# Patient Record
Sex: FEMALE | Race: OTHER | HISPANIC OR LATINO
[De-identification: names, ages, dates, MRNs, and addresses within clinical notes are randomized per-mention and may not be internally consistent; named-entity substitution may affect disease eponyms.]

---

## 2017-08-28 ENCOUNTER — APPOINTMENT (OUTPATIENT)
Dept: DERMATOLOGY | Facility: CLINIC | Age: 62
End: 2017-08-28
Payer: COMMERCIAL

## 2017-08-28 PROCEDURE — 99203 OFFICE O/P NEW LOW 30 MIN: CPT

## 2019-12-02 ENCOUNTER — RESULT REVIEW (OUTPATIENT)
Age: 64
End: 2019-12-02

## 2020-12-09 ENCOUNTER — APPOINTMENT (OUTPATIENT)
Dept: DERMATOLOGY | Facility: CLINIC | Age: 65
End: 2020-12-09
Payer: MEDICAID

## 2020-12-09 PROCEDURE — 99072 ADDL SUPL MATRL&STAF TM PHE: CPT

## 2020-12-09 PROCEDURE — 99203 OFFICE O/P NEW LOW 30 MIN: CPT

## 2020-12-21 ENCOUNTER — RESULT REVIEW (OUTPATIENT)
Age: 65
End: 2020-12-21

## 2021-12-23 ENCOUNTER — APPOINTMENT (OUTPATIENT)
Dept: DERMATOLOGY | Facility: CLINIC | Age: 66
End: 2021-12-23
Payer: COMMERCIAL

## 2021-12-23 PROCEDURE — 99213 OFFICE O/P EST LOW 20 MIN: CPT

## 2023-01-10 ENCOUNTER — OFFICE (OUTPATIENT)
Dept: URBAN - METROPOLITAN AREA CLINIC 6 | Facility: CLINIC | Age: 68
Setting detail: OPHTHALMOLOGY
End: 2023-01-10
Payer: MEDICARE

## 2023-01-10 DIAGNOSIS — H11.153: ICD-10-CM

## 2023-01-10 DIAGNOSIS — H25.13: ICD-10-CM

## 2023-01-10 DIAGNOSIS — H01.001: ICD-10-CM

## 2023-01-10 DIAGNOSIS — H01.004: ICD-10-CM

## 2023-01-10 DIAGNOSIS — H52.4: ICD-10-CM

## 2023-01-10 PROCEDURE — 92015 DETERMINE REFRACTIVE STATE: CPT | Performed by: OPHTHALMOLOGY

## 2023-01-10 PROCEDURE — 92014 COMPRE OPH EXAM EST PT 1/>: CPT | Performed by: OPHTHALMOLOGY

## 2023-01-10 ASSESSMENT — SPHEQUIV_DERIVED
OS_SPHEQUIV: 1.375
OS_SPHEQUIV: 1.75
OD_SPHEQUIV: 1.75
OS_SPHEQUIV: 1.375
OD_SPHEQUIV: 1.75
OD_SPHEQUIV: 1.875

## 2023-01-10 ASSESSMENT — TEAR BREAK UP TIME (TBUT)
OD_TBUT: T
OS_TBUT: T

## 2023-01-10 ASSESSMENT — AXIALLENGTH_DERIVED
OS_AL: 22.8938
OD_AL: 22.6271
OS_AL: 22.7571
OD_AL: 22.6719
OS_AL: 22.8938
OD_AL: 22.6719

## 2023-01-10 ASSESSMENT — KERATOMETRY
OD_K1POWER_DIOPTERS: 44.00
METHOD_AUTO_MANUAL: AUTO
OD_AXISANGLE_DEGREES: 140
OS_K2POWER_DIOPTERS: 44.25
OS_AXISANGLE_DEGREES: 37
OS_K1POWER_DIOPTERS: 43.75
OD_K2POWER_DIOPTERS: 44.50

## 2023-01-10 ASSESSMENT — REFRACTION_MANIFEST
OD_ADD: +2.75
OD_VA1: 20/30-
OS_SPHERE: +2.00
OD_VA1: 20/30-
OD_AXIS: 80
OS_SPHERE: +2.00
OD_SPHERE: +2.00
OS_AXIS: 115
OS_VA1: 20/30+
OS_CYLINDER: -1.25
OD_AXIS: 80
OS_VA1: 20/30+
OD_CYLINDER: -0.50
OS_AXIS: 115
OD_CYLINDER: -0.50
OS_ADD: +2.75
OS_CYLINDER: -1.25
OD_SPHERE: +2.00
OD_ADD: +2.75
OS_ADD: +2.75

## 2023-01-10 ASSESSMENT — TONOMETRY
OD_IOP_MMHG: 18
OS_IOP_MMHG: 20

## 2023-01-10 ASSESSMENT — REFRACTION_CURRENTRX
OD_AXIS: 70
OS_AXIS: 93
OS_CYLINDER: -0.75
OD_SPHERE: +2.00
OD_ADD: +2.50
OS_OVR_VA: 20/
OS_SPHERE: +1.75
OD_CYLINDER: -0.50
OS_ADD: +2.50
OD_VPRISM_DIRECTION: SV
OS_VPRISM_DIRECTION: SV
OD_OVR_VA: 20/

## 2023-01-10 ASSESSMENT — CONFRONTATIONAL VISUAL FIELD TEST (CVF)
OS_FINDINGS: FULL
OD_FINDINGS: FULL

## 2023-01-10 ASSESSMENT — REFRACTION_AUTOREFRACTION
OS_CYLINDER: -1.00
OD_SPHERE: +2.25
OD_CYLINDER: -0.75
OS_SPHERE: +2.25
OS_AXIS: 113
OD_AXIS: 78

## 2023-01-10 ASSESSMENT — LID POSITION - PTOSIS
OS_PTOSIS: LUL
OD_PTOSIS: RUL

## 2023-01-10 ASSESSMENT — VISUAL ACUITY
OS_BCVA: 20/30-1
OD_BCVA: 20/40

## 2023-01-10 ASSESSMENT — LID POSITION - DERMATOCHALASIS
OD_DERMATOCHALASIS: RUL
OS_DERMATOCHALASIS: LUL

## 2023-02-27 ENCOUNTER — APPOINTMENT (OUTPATIENT)
Dept: ORTHOPEDIC SURGERY | Facility: CLINIC | Age: 68
End: 2023-02-27
Payer: MEDICARE

## 2023-02-27 ENCOUNTER — APPOINTMENT (OUTPATIENT)
Dept: PULMONOLOGY | Facility: CLINIC | Age: 68
End: 2023-02-27
Payer: MEDICARE

## 2023-02-27 VITALS
RESPIRATION RATE: 16 BRPM | OXYGEN SATURATION: 98 % | WEIGHT: 129 LBS | BODY MASS INDEX: 24.35 KG/M2 | DIASTOLIC BLOOD PRESSURE: 80 MMHG | SYSTOLIC BLOOD PRESSURE: 122 MMHG | HEIGHT: 61 IN | HEART RATE: 99 BPM

## 2023-02-27 VITALS
BODY MASS INDEX: 24.35 KG/M2 | HEART RATE: 94 BPM | HEIGHT: 61 IN | WEIGHT: 129 LBS | SYSTOLIC BLOOD PRESSURE: 126 MMHG | DIASTOLIC BLOOD PRESSURE: 82 MMHG

## 2023-02-27 DIAGNOSIS — M17.0 BILATERAL PRIMARY OSTEOARTHRITIS OF KNEE: ICD-10-CM

## 2023-02-27 DIAGNOSIS — R29.898 OTHER SYMPTOMS AND SIGNS INVOLVING THE MUSCULOSKELETAL SYSTEM: ICD-10-CM

## 2023-02-27 DIAGNOSIS — R05.8 OTHER SPECIFIED COUGH: ICD-10-CM

## 2023-02-27 PROCEDURE — 73562 X-RAY EXAM OF KNEE 3: CPT | Mod: LT

## 2023-02-27 PROCEDURE — 99204 OFFICE O/P NEW MOD 45 MIN: CPT | Mod: 25

## 2023-02-27 PROCEDURE — 99406 BEHAV CHNG SMOKING 3-10 MIN: CPT

## 2023-02-27 PROCEDURE — 99203 OFFICE O/P NEW LOW 30 MIN: CPT

## 2023-02-27 NOTE — PHYSICAL EXAM
[III] : Mallampati Class: III [Supple] : supple [No Neck Mass] : no neck mass [No JVD] : no jvd [Normal S1, S2] : normal s1, s2 [Benign] : benign [No Masses] : no masses [Soft] : soft [No Hernias] : no hernias [Normal Bowel Sounds] : normal bowel sounds [Normal Gait] : normal gait [No Clubbing] : no clubbing [No Edema] : no edema [No Rash] : no rash [No Motor Deficits] : no motor deficits [Normal Affect] : normal affect [TextBox_2] : pleasant f no distress speaking full sentences   [TextBox_11] : moist  no lesions     [TextBox_68] : bilateral air entry   no   w/r/r

## 2023-02-27 NOTE — HISTORY OF PRESENT ILLNESS
[Worsening] : worsening [___ yrs] : [unfilled] year(s) ago [0] : a current pain level of 0/10 [4] : a maximum pain level of 4/10 [de-identified] : pt is here for b/l pain, left worse then right in the past about 2 years. \par she notes pain around the knee cap\par she has pain with stairs. generally, walking on flat surface is okay. \par If she does so much as stairs her knees will swell, mostly around knee cap.  \par she c/o weakness and instability. No exercise or activities. Still working as a hairstylist. \par she use TENs unit. and knee brace\par occasional lateral hip pain or groin pain. \par

## 2023-02-27 NOTE — PHYSICAL EXAM
[de-identified] : GENERAL APPEARANCE: Well nourished and hydrated, pleasant, alert, and oriented x 3. Appears their stated age. \par HEENT: Normocephalic, extraocular eye motion intact. Nasal septum midline. Oral cavity clear. External auditory canal clear. \par RESPIRATORY: Breath sounds clear and audible in all lobes. No wheezing, No accessory muscle use.\par CARDIOVASCULAR: No apparent abnormalities. No lower leg edema. No varicosities. Pedal pulses are palpable.\par NEUROLOGIC: Sensation is normal, no muscle weakness in the upper or lower extremities.\par DERMATOLOGIC: No apparent skin lesions, moist, warm, no rash.\par SPINE: Cervical spine appears normal and moves freely; thoracic spine appears normal and moves freely; lumbosacral spine appears normal and moves freely, normal, nontender.\par MUSCULOSKELETAL: Hands, wrists, and elbows are normal and move freely, shoulders are normal and move freely. \par Musculoskeletal\par 5/5 motor strength in bilateral lower extremities. Sensory: Intact in bilateral lower extremities. DTRs: Biceps, brachioradialis, triceps, patellar, ankle and plantar 2+ and symmetric bilaterally. Pulses: dorsalis pedis, posterior tibial, femoral, popliteal, and radial 2+ and symmetric bilaterally. \par Constitutional: Alert and in no acute distress, but well-appearing. \par  [de-identified] : Left knee examination shows normal alignment range of motion is 0 to 130 degree without pain no joint effusion\par \par Right knee examination shows normal alignment range of motion is 0 to 130 degree without pain no joint effusion [de-identified] : 3V xray of the left knee done in the office today and reviewed by Dr. Ash Lopez demonstrates mild medial compartment o.a.

## 2023-02-27 NOTE — ASSESSMENT
[FreeTextEntry1] : 66y/o  female born in [facility]\par \par 1-   cough  likely   / COPD / asthma and allergic rhinitis\par 2-   > 20  pack year  current   smoker\par 3-   GERD\par 4-  vaccinations per primary \par \par Recommendations\par 1-  trial of albuterol MDI  two inha  q 6hour\par 2- smoking cessations\par 3- cxr\par 4- screening ct\par 5-  PFT\par 6-  stiolto  too expensive --  trial of incruse   qd  if covered -  teaching  use today\par \par return after PFT  agrees

## 2023-02-27 NOTE — END OF VISIT
[FreeTextEntry4] : I, Jaz Butcher, acted solely as a scribe for Dr. Ash Lopez on this date 02/27/2023 .

## 2023-02-27 NOTE — DISCUSSION/SUMMARY
[de-identified] : 66 y/o F here with left greater than right knee pain due to mild medial compartment osteoarthritis.  XR imaging was completed in office today and results were reviewed with the patient. Based on Her imaging, she is not a current candidate for a TKA. We also discussed exhausting conservative therapy options such as physical therapy and low impact exercise, cortisone injections, and HA injections. I recommend the patient undergo a course of physical therapy for the B/L knees  2-3 times a week for a total of 6-8 weeks. A prescription was given for the physical therapy today. If no improvements will give Meloxicam to use PRN. F/u with us PRN. If no improvements after completing PT, will order MRI of the LT knee for further evaluation. \par \par

## 2023-02-27 NOTE — HISTORY OF PRESENT ILLNESS
[TextBox_4] : 68y/o female    born   in [facility] ( [birth year]-  one pack day  now on and off  7-9 cig day)   work   hairstyles\par \par -first noted   coughing   due to allergy    spring and fall  x 10 years\par -  dog down stairs\par -  phlegm  stuck in throat  treated for   nasal sprays    gerd \par -also given albuterol MDI     and feels a little better \par \par -never  had covid  moderna vaccine     flu shot  \par \par

## 2023-02-27 NOTE — REVIEW OF SYSTEMS
[Sinus Problems] : sinus problems [Cough] : cough [Sputum] : sputum [Wheezing] : wheezing [GERD] : gerd [Fever] : no fever [Recent Wt Gain (___ Lbs)] : ~T no recent weight gain [Chills] : no chills [Recent Wt Loss (___ Lbs)] : ~T no recent weight loss [Epistaxis] : no epistaxis [Sore Throat] : no sore throat [Hemoptysis] : no hemoptysis [Dyspnea] : no dyspnea [SOB on Exertion] : no sob on exertion [Abdominal Pain] : no abdominal pain [Nausea] : no nausea [Vomiting] : no vomiting [Dysphagia] : no dysphagia [Bleeding] : no bleeding [Rash] : no rash [Blood Transfusion] : no blood transfusion [Clotting Disorder/ Frequent bleeding] : no clotting disorder/ frequent bleeding [Diabetes] : no diabetes [Thyroid Problem] : no thyroid problem [Obesity] : no obesity [TextBox_30] : clear

## 2023-03-16 ENCOUNTER — NON-APPOINTMENT (OUTPATIENT)
Age: 68
End: 2023-03-16

## 2023-03-20 ENCOUNTER — OUTPATIENT (OUTPATIENT)
Dept: OUTPATIENT SERVICES | Facility: HOSPITAL | Age: 68
LOS: 1 days | End: 2023-03-20
Payer: COMMERCIAL

## 2023-03-20 ENCOUNTER — APPOINTMENT (OUTPATIENT)
Dept: CT IMAGING | Facility: CLINIC | Age: 68
End: 2023-03-20
Payer: MEDICARE

## 2023-03-20 ENCOUNTER — APPOINTMENT (OUTPATIENT)
Dept: RADIOLOGY | Facility: CLINIC | Age: 68
End: 2023-03-20
Payer: MEDICARE

## 2023-03-20 VITALS — WEIGHT: 129 LBS | BODY MASS INDEX: 24.35 KG/M2 | HEIGHT: 61 IN

## 2023-03-20 DIAGNOSIS — R05.8 OTHER SPECIFIED COUGH: ICD-10-CM

## 2023-03-20 PROCEDURE — 71046 X-RAY EXAM CHEST 2 VIEWS: CPT | Mod: 26

## 2023-03-20 PROCEDURE — 71271 CT THORAX LUNG CANCER SCR C-: CPT | Mod: 26

## 2023-03-20 PROCEDURE — 71046 X-RAY EXAM CHEST 2 VIEWS: CPT

## 2023-03-20 PROCEDURE — 71271 CT THORAX LUNG CANCER SCR C-: CPT

## 2023-03-20 NOTE — HISTORY OF PRESENT ILLNESS
[Current] : Current [TextBox_13] : Referred by Dr. Carole Cardenas.\par \par Ms. CADENA is a 67 year old female with a history of HTN, HLD and asthma.\par \par She was called to review eligibility for Low-Dose CT lung cancer screening.  Reviewed and confirmed that the patient meets screening eligibility criteria:\par \par 67 years old \par \par Smoking Status:  Current Smoker\par \par Number of pack(s) per day: 1\par Number of years smoked: 40\par Number of pack years smokin\par \par No symptoms of lung cancer, including new cough, change in cough, hemoptysis, and unintentional weight loss.\par \par No personal history of lung cancer.  No lung cancer in a first degree relative.  No history of  occupational exposures. [PacksperDay] : 1 [N_Years] : 40 [PacksperYear] : 40

## 2023-03-21 ENCOUNTER — NON-APPOINTMENT (OUTPATIENT)
Age: 68
End: 2023-03-21

## 2023-03-28 ENCOUNTER — NON-APPOINTMENT (OUTPATIENT)
Age: 68
End: 2023-03-28

## 2023-05-01 ENCOUNTER — APPOINTMENT (OUTPATIENT)
Dept: PULMONOLOGY | Facility: CLINIC | Age: 68
End: 2023-05-01
Payer: MEDICARE

## 2023-05-01 VITALS
WEIGHT: 136 LBS | SYSTOLIC BLOOD PRESSURE: 102 MMHG | OXYGEN SATURATION: 98 % | DIASTOLIC BLOOD PRESSURE: 78 MMHG | HEART RATE: 85 BPM | BODY MASS INDEX: 25.7 KG/M2 | RESPIRATION RATE: 16 BRPM

## 2023-05-01 PROCEDURE — 99406 BEHAV CHNG SMOKING 3-10 MIN: CPT

## 2023-05-01 PROCEDURE — 99213 OFFICE O/P EST LOW 20 MIN: CPT | Mod: 25

## 2023-05-01 RX ORDER — ATORVASTATIN CALCIUM 40 MG/1
40 TABLET, FILM COATED ORAL
Qty: 90 | Refills: 0 | Status: ACTIVE | COMMUNITY
Start: 2023-02-01

## 2023-05-01 RX ORDER — AZELASTINE HYDROCHLORIDE 137 UG/1
0.1 SPRAY, METERED NASAL
Qty: 30 | Refills: 0 | Status: ACTIVE | COMMUNITY
Start: 2023-02-14

## 2023-05-01 RX ORDER — LOSARTAN POTASSIUM 50 MG/1
50 TABLET, FILM COATED ORAL
Qty: 90 | Refills: 0 | Status: ACTIVE | COMMUNITY
Start: 2023-01-31

## 2023-05-01 RX ORDER — FAMOTIDINE 40 MG/1
40 TABLET, FILM COATED ORAL
Qty: 90 | Refills: 0 | Status: ACTIVE | COMMUNITY
Start: 2023-02-14

## 2023-05-01 NOTE — ASSESSMENT
[FreeTextEntry1] : 66y/o  female born in [facility]\par \par 1-   cough  likely   / COPD / asthma and allergic rhinitis  trigger humid weather \par 2-   > 20  pack year  current   smoker / ct 3/2023   2 mm nodule f/u one year \par 3-   GERD\par 4-  vaccinations per primary - reviewed today \par \par Recommendations\par 1-  t albuterol MDI  two inha  q 6hour\par 2- smoking cessations\par 3- PFT  re-ordered \par 4-   expensive medications  sample of stiolto  given teaching \par 5- ct 3/2024 \par \par return after pft agrees \par \par

## 2023-05-01 NOTE — REVIEW OF SYSTEMS
[Sinus Problems] : sinus problems [Cough] : cough [Sputum] : sputum [GERD] : gerd [Fever] : no fever [Recent Wt Gain (___ Lbs)] : ~T no recent weight gain [Chills] : no chills [Recent Wt Loss (___ Lbs)] : ~T no recent weight loss [Epistaxis] : no epistaxis [Sore Throat] : no sore throat [Hemoptysis] : no hemoptysis [Dyspnea] : no dyspnea [Wheezing] : no wheezing [SOB on Exertion] : no sob on exertion [Abdominal Pain] : no abdominal pain [Nausea] : no nausea [Vomiting] : no vomiting [Dysphagia] : no dysphagia [Bleeding] : no bleeding [Rash] : no rash [Blood Transfusion] : no blood transfusion [Clotting Disorder/ Frequent bleeding] : no clotting disorder/ frequent bleeding [Diabetes] : no diabetes [Thyroid Problem] : no thyroid problem [Obesity] : no obesity [TextBox_30] : clear

## 2023-05-01 NOTE — PHYSICAL EXAM
[Enlarged Base of the Tongue] : enlarged base of the tongue [III] : Mallampati Class: III [Normal Appearance] : normal appearance [Supple] : supple [Normal Rate/Rhythm] : normal rate/rhythm [Normal S1, S2] : normal s1, s2 [Clear to Auscultation Bilaterally] : clear to auscultation bilaterally [Benign] : benign [Not Tender] : not tender [Soft] : soft [No Hernias] : no hernias [Normal Bowel Sounds] : normal bowel sounds [Normal Gait] : normal gait [No Clubbing] : no clubbing [No Edema] : no edema [No Rash] : no rash [No Focal Deficits] : no focal deficits [Normal Affect] : normal affect [TextBox_2] : pleasant f no distress speaking full sentences noc ough  [TextBox_11] : moist no lesion

## 2023-05-30 ENCOUNTER — OFFICE (OUTPATIENT)
Dept: URBAN - METROPOLITAN AREA CLINIC 6 | Facility: CLINIC | Age: 68
Setting detail: OPHTHALMOLOGY
End: 2023-05-30

## 2023-05-30 DIAGNOSIS — H52.7: ICD-10-CM

## 2023-05-30 PROCEDURE — RX/CHECK RX/CHECK: Performed by: OPHTHALMOLOGY

## 2023-05-30 ASSESSMENT — SPHEQUIV_DERIVED
OS_SPHEQUIV: 1.375
OD_SPHEQUIV: 2.5
OS_SPHEQUIV: 2.5
OD_SPHEQUIV: 2.125
OS_SPHEQUIV: 2.375
OD_SPHEQUIV: 1.75

## 2023-05-30 ASSESSMENT — KERATOMETRY
OS_AXISANGLE_DEGREES: 024
OS_K2POWER_DIOPTERS: 44.50
OD_K2POWER_DIOPTERS: 44.50
METHOD_AUTO_MANUAL: AUTO
OD_AXISANGLE_DEGREES: 154
OD_K1POWER_DIOPTERS: 43.75
OS_K1POWER_DIOPTERS: 43.50

## 2023-05-30 ASSESSMENT — REFRACTION_CURRENTRX
OD_OVR_VA: 20/
OD_CYLINDER: -0.50
OD_VPRISM_DIRECTION: PROGS
OS_OVR_VA: 20/
OS_AXIS: 117
OD_ADD: +2.75
OS_CYLINDER: -1.25
OS_OVR_VA: 20/
OS_SPHERE: +2.00
OD_AXIS: 087
OD_SPHERE: +2.00
OS_ADD: +2.75
OS_VPRISM_DIRECTION: PROGS
OD_OVR_VA: 20/

## 2023-05-30 ASSESSMENT — REFRACTION_AUTOREFRACTION
OS_SPHERE: +3.00
OD_SPHERE: +2.75
OD_CYLINDER: -0.50
OS_CYLINDER: -1.25
OS_AXIS: 113
OD_AXIS: 082

## 2023-05-30 ASSESSMENT — REFRACTION_MANIFEST
OS_VA1: 20/30+
OS_ADD: +2.75
OU_VA: 20/20-1
OS_AXIS: 115
OD_ADD: +2.75
OD_SPHERE: +2.50
OD_SPHERE: +2.00
OS_SPHERE: +3.00
OS_VA1: 20/30+
OD_AXIS: 80
OS_AXIS: 115
OS_SPHERE: +2.00
OS_CYLINDER: -1.25
OD_AXIS: 080
OD_CYLINDER: -0.50
OD_ADD: +2.75
OS_CYLINDER: -1.00
OD_VA1: 20/30-
OD_CYLINDER: -0.75
OS_ADD: +2.75
OD_VA1: 20/40

## 2023-05-30 ASSESSMENT — CONFRONTATIONAL VISUAL FIELD TEST (CVF)
OS_FINDINGS: FULL
OD_FINDINGS: FULL

## 2023-05-30 ASSESSMENT — TONOMETRY
OD_IOP_MMHG: 16
OS_IOP_MMHG: 20

## 2023-05-30 ASSESSMENT — AXIALLENGTH_DERIVED
OD_AL: 22.7144
OS_AL: 22.4884
OD_AL: 22.4467
OS_AL: 22.8938
OS_AL: 22.5327
OD_AL: 22.5798

## 2023-05-30 ASSESSMENT — VISUAL ACUITY
OD_BCVA: 20/60
OS_BCVA: 20/50

## 2023-08-07 ENCOUNTER — APPOINTMENT (OUTPATIENT)
Dept: PULMONOLOGY | Facility: CLINIC | Age: 68
End: 2023-08-07
Payer: MEDICARE

## 2023-08-07 VITALS — HEIGHT: 61 IN | WEIGHT: 118 LBS | BODY MASS INDEX: 22.28 KG/M2

## 2023-08-07 VITALS
SYSTOLIC BLOOD PRESSURE: 110 MMHG | DIASTOLIC BLOOD PRESSURE: 64 MMHG | HEART RATE: 98 BPM | RESPIRATION RATE: 16 BRPM | OXYGEN SATURATION: 98 %

## 2023-08-07 DIAGNOSIS — J45.909 UNSPECIFIED ASTHMA, UNCOMPLICATED: ICD-10-CM

## 2023-08-07 PROCEDURE — 94727 GAS DIL/WSHOT DETER LNG VOL: CPT

## 2023-08-07 PROCEDURE — 99406 BEHAV CHNG SMOKING 3-10 MIN: CPT

## 2023-08-07 PROCEDURE — 94010 BREATHING CAPACITY TEST: CPT

## 2023-08-07 PROCEDURE — 85018 HEMOGLOBIN: CPT | Mod: QW

## 2023-08-07 PROCEDURE — 94729 DIFFUSING CAPACITY: CPT

## 2023-08-07 PROCEDURE — 99213 OFFICE O/P EST LOW 20 MIN: CPT | Mod: 25

## 2023-08-07 RX ORDER — ALBUTEROL SULFATE 90 UG/1
108 (90 BASE) INHALANT RESPIRATORY (INHALATION)
Qty: 8 | Refills: 0 | Status: DISCONTINUED | COMMUNITY
Start: 2023-01-25 | End: 2023-08-07

## 2023-08-07 RX ORDER — ALBUTEROL SULFATE 2.5 MG/3ML
(2.5 MG/3ML) SOLUTION RESPIRATORY (INHALATION)
Qty: 300 | Refills: 4 | Status: ACTIVE | COMMUNITY
Start: 2023-08-07 | End: 1900-01-01

## 2023-08-07 RX ORDER — ALBUTEROL SULFATE 90 UG/1
108 (90 BASE) AEROSOL, METERED RESPIRATORY (INHALATION)
Qty: 1 | Refills: 5 | Status: ACTIVE | COMMUNITY
Start: 2023-08-07 | End: 1900-01-01

## 2023-08-07 RX ORDER — UMECLIDINIUM 62.5 UG/1
62.5 AEROSOL, POWDER ORAL
Qty: 3 | Refills: 0 | Status: DISCONTINUED | COMMUNITY
Start: 2023-02-27 | End: 2023-08-07

## 2023-08-07 RX ORDER — IPRATROPIUM BROMIDE AND ALBUTEROL SULFATE 2.5; .5 MG/3ML; MG/3ML
0.5-2.5 (3) SOLUTION RESPIRATORY (INHALATION) EVERY 8 HOURS
Qty: 270 | Refills: 2 | Status: ACTIVE | COMMUNITY
Start: 2023-08-07 | End: 1900-01-01

## 2023-08-07 NOTE — HISTORY OF PRESENT ILLNESS
[Current] : current [Never] : never [>= 20 pack years] : >= 20 pack years [TextBox_4] : 66y/o female    born   in [facility] ( 18-  one pack day  now on and off  7-9 cig day)   work   hairstyles  -first noted   coughing   due to allergy    spring and fall  x 10 years -  dog down stairs -  phlegm  stuck in throat  treated for   nasal sprays    gerd  -also given albuterol MDI     and feels a little better   -never  had covid  moderna vaccine     flu shot    5/1/2023 66y/o  female born in [facility]   current smoker  7-9  cig/day   ( 18- one pack day  now on and off )  work hairstyles  - doing well -ct reviewed - saline n vic spray   doing  well - cough  improved  -    phlegm clear   trigger humid  weather   8/7/2023 66y/o female   current smoker   now 5 cig/day   ( was one pack dayx 40 years )  not ready to quit    f/u   asthma/ COPD - reviewed ct again  -reviewed PFT  small airway obs - doing well on stiloto  - has nebulizer at home  -no ches tpain   -    [TextBox_11] : 1

## 2023-08-07 NOTE — REASON FOR VISIT
[Follow-Up] : a follow-up visit [Cough] : cough [COPD] : COPD [Nicotine Dependence] : nicotine dependence

## 2023-08-07 NOTE — PROCEDURE
[FreeTextEntry1] : PFT  8/7/2023 Good efforts  ? flattening of insp loop  Normal  FVC  normal  FEV1  R rerduced     reduced  fef  25-75%  Lung volumes HE    nomral  TLC   RV/TLC dlco and DLCo/va normal

## 2023-08-07 NOTE — PHYSICAL EXAM
[Enlarged Base of the Tongue] : enlarged base of the tongue [IV] : Mallampati Class: IV [Normal Appearance] : normal appearance [Supple] : supple [No JVD] : no jvd [Normal Rate/Rhythm] : normal rate/rhythm [Normal S1, S2] : normal s1, s2 [No Murmurs] : no murmurs [No Resp Distress] : no resp distress [No Acc Muscle Use] : no acc muscle use [Clear to Auscultation Bilaterally] : clear to auscultation bilaterally [Kyphosis] : kyphosis [Benign] : benign [Not Tender] : not tender [Soft] : soft [Normal Bowel Sounds] : normal bowel sounds [Normal Gait] : normal gait [No Clubbing] : no clubbing [No Edema] : no edema [No Rash] : no rash [No Motor Deficits] : no motor deficits [Normal Affect] : normal affect [TextBox_2] : pleasant f no distress speaking full sentences     no lesions [TextBox_11] : moist no   lesions

## 2023-08-07 NOTE — REVIEW OF SYSTEMS
[Sinus Problems] : sinus problems [Cough] : cough [Sputum] : sputum [GERD] : gerd [Fever] : no fever [Recent Wt Gain (___ Lbs)] : ~T no recent weight gain [Chills] : no chills [Recent Wt Loss (___ Lbs)] : ~T no recent weight loss [Epistaxis] : no epistaxis [Sore Throat] : no sore throat [Hemoptysis] : no hemoptysis [Dyspnea] : no dyspnea [Wheezing] : no wheezing [SOB on Exertion] : no sob on exertion [Chest Discomfort] : no chest discomfort [Palpitations] : no palpitations [Abdominal Pain] : no abdominal pain [Nausea] : no nausea [Vomiting] : no vomiting [Dysphagia] : no dysphagia [Bleeding] : no bleeding [Rash] : no rash [Blood Transfusion] : no blood transfusion [Clotting Disorder/ Frequent bleeding] : no clotting disorder/ frequent bleeding [Diabetes] : no diabetes [Thyroid Problem] : no thyroid problem [Obesity] : no obesity [TextBox_30] : clear

## 2023-08-07 NOTE — ASSESSMENT
[FreeTextEntry1] : 66y/o  female born in [facility]  1-    mild  COPD / asthma and allergic rhinitis  trigger humid weather  2-   > 20  pack year  current   smoker / ct 3/2023   2 mm nodule f/u one year  3-   GERD /  flattening of insp loop    follows with ENT yearly     4-  vaccinations  per primary - reviewed today   Recommendations 1-  t albuterol MDI  two inha  q 6hour 2- smoking cessations 3-  add duonebs      q 8  (  if no stiolto )  4-   expensive medications  sample of stiolto  given teaching  5- ct 3/2024   return 4- 6  months

## 2023-12-11 ENCOUNTER — APPOINTMENT (OUTPATIENT)
Dept: PULMONOLOGY | Facility: CLINIC | Age: 68
End: 2023-12-11

## 2024-03-12 ENCOUNTER — OFFICE (OUTPATIENT)
Dept: URBAN - METROPOLITAN AREA CLINIC 6 | Facility: CLINIC | Age: 69
Setting detail: OPHTHALMOLOGY
End: 2024-03-12
Payer: MEDICARE

## 2024-03-12 DIAGNOSIS — H11.153: ICD-10-CM

## 2024-03-12 DIAGNOSIS — H25.13: ICD-10-CM

## 2024-03-12 DIAGNOSIS — H01.001: ICD-10-CM

## 2024-03-12 DIAGNOSIS — H52.4: ICD-10-CM

## 2024-03-12 DIAGNOSIS — H01.004: ICD-10-CM

## 2024-03-12 PROCEDURE — 92014 COMPRE OPH EXAM EST PT 1/>: CPT | Performed by: OPHTHALMOLOGY

## 2024-03-12 PROCEDURE — 92015 DETERMINE REFRACTIVE STATE: CPT | Performed by: OPHTHALMOLOGY

## 2024-03-12 ASSESSMENT — LID POSITION - PTOSIS
OD_PTOSIS: RUL
OS_PTOSIS: LUL

## 2024-03-12 ASSESSMENT — SPHEQUIV_DERIVED
OD_SPHEQUIV: 2.125
OS_SPHEQUIV: 2
OS_SPHEQUIV: 2.5
OD_SPHEQUIV: 2.125

## 2024-03-12 ASSESSMENT — REFRACTION_CURRENTRX
OD_VPRISM_DIRECTION: PROGS
OS_ADD: +2.75
OS_AXIS: 112
OD_OVR_VA: 20/
OS_SPHERE: +2.00
OD_SPHERE: +2.00
OS_OVR_VA: 20/
OD_AXIS: 082
OS_CYLINDER: -1.00
OS_VPRISM_DIRECTION: PROGS
OD_CYLINDER: -0.50
OD_ADD: +3.00

## 2024-03-12 ASSESSMENT — REFRACTION_MANIFEST
OD_SPHERE: +2.50
OD_SPHERE: +2.50
OD_ADD: +2.75
OS_AXIS: 120
OS_ADD: +2.75
OS_VA1: 20/30
OD_CYLINDER: -0.75
OD_AXIS: 090
OD_ADD: +2.75
OD_VA1: 20/25
OS_SPHERE: +2.50
OS_VA1: 20/30
OS_SPHERE: +3.00
OS_ADD: +2.75
OD_AXIS: 090
OD_VA1: 20/25
OS_CYLINDER: -1.00
OS_AXIS: 120
OU_VA: 20/25
OU_VA: 20/25
OS_CYLINDER: -1.00
OD_CYLINDER: -0.75

## 2024-03-12 ASSESSMENT — LID POSITION - DERMATOCHALASIS
OD_DERMATOCHALASIS: RUL
OS_DERMATOCHALASIS: LUL

## 2024-05-06 ENCOUNTER — APPOINTMENT (OUTPATIENT)
Dept: PULMONOLOGY | Facility: CLINIC | Age: 69
End: 2024-05-06
Payer: MEDICARE

## 2024-05-06 VITALS
OXYGEN SATURATION: 98 % | DIASTOLIC BLOOD PRESSURE: 66 MMHG | HEART RATE: 81 BPM | SYSTOLIC BLOOD PRESSURE: 110 MMHG | RESPIRATION RATE: 16 BRPM

## 2024-05-06 VITALS — WEIGHT: 120 LBS | HEIGHT: 61 IN | BODY MASS INDEX: 22.66 KG/M2

## 2024-05-06 DIAGNOSIS — Z80.8 FAMILY HISTORY OF MALIGNANT NEOPLASM OF OTHER ORGANS OR SYSTEMS: ICD-10-CM

## 2024-05-06 DIAGNOSIS — Z80.3 FAMILY HISTORY OF MALIGNANT NEOPLASM OF BREAST: ICD-10-CM

## 2024-05-06 DIAGNOSIS — J44.9 CHRONIC OBSTRUCTIVE PULMONARY DISEASE, UNSPECIFIED: ICD-10-CM

## 2024-05-06 PROCEDURE — 99406 BEHAV CHNG SMOKING 3-10 MIN: CPT

## 2024-05-06 PROCEDURE — 99213 OFFICE O/P EST LOW 20 MIN: CPT

## 2024-05-06 PROCEDURE — G2211 COMPLEX E/M VISIT ADD ON: CPT

## 2024-05-06 RX ORDER — ASPIRIN 81 MG
81 TABLET, DELAYED RELEASE (ENTERIC COATED) ORAL
Refills: 0 | Status: ACTIVE | COMMUNITY

## 2024-05-06 RX ORDER — MONTELUKAST 10 MG/1
10 TABLET, FILM COATED ORAL
Qty: 90 | Refills: 0 | Status: DISCONTINUED | COMMUNITY
Start: 2022-11-29 | End: 2024-05-06

## 2024-05-06 RX ORDER — DIPHENHYDRAMINE HCL 2 %
CREAM (GRAM) TOPICAL
Refills: 0 | Status: ACTIVE | COMMUNITY

## 2024-05-06 RX ORDER — UMECLIDINIUM BROMIDE AND VILANTEROL TRIFENATATE 62.5; 25 UG/1; UG/1
62.5-25 POWDER RESPIRATORY (INHALATION)
Refills: 0 | Status: ACTIVE | COMMUNITY

## 2024-05-06 RX ORDER — CETIRIZINE HYDROCHLORIDE 10 MG/1
TABLET, FILM COATED ORAL
Refills: 0 | Status: ACTIVE | COMMUNITY

## 2024-05-06 NOTE — HISTORY OF PRESENT ILLNESS
[Current] : current [>= 20 pack years] : >= 20 pack years [Never] : never [TextBox_4] : 68y/o female    born   in [facility] ( 18-  one pack day  now on and off  7-9 cig day)   work   hairstyles  -first noted   coughing   due to allergy    spring and fall  x 10 years -  dog down stairs -  phlegm  stuck in throat  treated for   nasal sprays    gerd  -also given albuterol MDI     and feels a little better   -never  had covid  moderna vaccine     flu shot    5/1/2023 68y/o  female born in [facility]   current smoker  7-9  cig/day   ( 18- one pack day  now on and off )  work hairstyles  - doing well -ct reviewed - saline n vic spray   doing  well - cough  improved  -    phlegm clear   trigger humid  weather   8/7/2023 68y/o female   current smoker   now 5 cig/day   ( was one pack dayx 40 years )  not ready to quit    f/u   asthma/ COPD - reviewed ct again  -reviewed PFT  small airway obs - doing well on stiloto  - has nebulizer at home  -no ches tpain   -  5/6/2024 69y/o  female current smoker  8 cig/day    not ready to quit  asthma copd - currently taking    anoro    prn nebs  not needed  -  no hemoptysis  - trigger is seasonal       fall    with increase in sob -no issues   [TextBox_11] : 1

## 2024-05-06 NOTE — ASSESSMENT
[FreeTextEntry1] : 69y/o  female born in [facility]  1-    mild  COPD / asthma and allergic rhinitis  trigger humid weather  2-   > 20  pack year  current   smoker / ct 3/2023   2 mm nodule f/u one year  3-   GERD /  flattening of insp loop    follows with ENT yearly     4-  vaccinations  per primary - reviewed today   Recommendations 1-  t albuterol MDI  two inha  q 6hour 2- smoking cessations 3-  add duonebs      q 8  (  if no anoro    albuterol only  reviewed )   4-   expensive medications  sample of [facility]   5- ct 3/2024  6- PFT   return 6- 12   months  agreement on plan

## 2024-05-06 NOTE — REASON FOR VISIT
[Follow-Up] : a follow-up visit [Asthma] : asthma [Nicotine Dependence] : nicotine dependence [COPD] : COPD

## 2024-05-06 NOTE — PHYSICAL EXAM
[No Acute Distress] : no acute distress [No Deformities] : no deformities [Normal Appearance] : normal appearance [Supple] : supple [No Neck Mass] : no neck mass [No JVD] : no jvd [Normal Rate/Rhythm] : normal rate/rhythm [Normal S1, S2] : normal s1, s2 [No Murmurs] : no murmurs [No Resp Distress] : no resp distress [Clear to Auscultation Bilaterally] : clear to auscultation bilaterally [Benign] : benign [Not Tender] : not tender [No Hernias] : no hernias [Normal Gait] : normal gait [No Clubbing] : no clubbing [No Edema] : no edema [No Rash] : no rash [No Motor Deficits] : no motor deficits [Normal Affect] : normal affect [Enlarged Base of the Tongue] : enlarged base of the tongue [III] : Mallampati Class: III [TextBox_2] : pleasant f no distress speaking full sentences no cough  [TextBox_11] : no  lesion  moist

## 2024-05-12 ENCOUNTER — NON-APPOINTMENT (OUTPATIENT)
Age: 69
End: 2024-05-12

## 2024-05-13 VITALS — HEIGHT: 61 IN | WEIGHT: 120 LBS | BODY MASS INDEX: 22.66 KG/M2

## 2024-05-13 DIAGNOSIS — F17.210 NICOTINE DEPENDENCE, CIGARETTES, UNCOMPLICATED: ICD-10-CM

## 2024-05-13 NOTE — HISTORY OF PRESENT ILLNESS
[Current] : Current [TextBox_13] : Referred by Dr. Carole Cardenas. Ms. CADENA is a 68 year old female with a history of HTN, HLD and asthma. She was called to review eligibility for Low-Dose CT lung cancer screening. Reviewed and confirmed that the patient meets screening eligibility criteria: 68 years old Smoking Status: Current Smoker Number of pack(s) per day: 1 Number of years smoked: 40 Number of pack years smokin No symptoms of lung cancer, including new cough, change in cough, hemoptysis, and unintentional weight loss. No personal history of lung cancer. No lung cancer in a first degree relative. No history of occupational exposures. [PacksperYear] : 40

## 2024-05-20 ENCOUNTER — OUTPATIENT (OUTPATIENT)
Dept: OUTPATIENT SERVICES | Facility: HOSPITAL | Age: 69
LOS: 1 days | End: 2024-05-20

## 2024-05-20 ENCOUNTER — APPOINTMENT (OUTPATIENT)
Dept: CT IMAGING | Facility: CLINIC | Age: 69
End: 2024-05-20
Payer: MEDICARE

## 2024-05-20 DIAGNOSIS — F17.210 NICOTINE DEPENDENCE, CIGARETTES, UNCOMPLICATED: ICD-10-CM

## 2024-05-20 DIAGNOSIS — J45.909 UNSPECIFIED ASTHMA, UNCOMPLICATED: ICD-10-CM

## 2024-05-20 PROCEDURE — 71271 CT THORAX LUNG CANCER SCR C-: CPT | Mod: 26

## 2024-05-21 ENCOUNTER — TRANSCRIPTION ENCOUNTER (OUTPATIENT)
Age: 69
End: 2024-05-21

## 2024-05-28 ENCOUNTER — NON-APPOINTMENT (OUTPATIENT)
Age: 69
End: 2024-05-28

## 2025-04-22 ENCOUNTER — APPOINTMENT (OUTPATIENT)
Age: 70
End: 2025-04-22
Payer: MEDICARE

## 2025-04-22 VITALS
OXYGEN SATURATION: 98 % | HEART RATE: 87 BPM | TEMPERATURE: 98 F | SYSTOLIC BLOOD PRESSURE: 128 MMHG | BODY MASS INDEX: 24.55 KG/M2 | DIASTOLIC BLOOD PRESSURE: 80 MMHG | WEIGHT: 130 LBS | HEIGHT: 61 IN

## 2025-04-22 DIAGNOSIS — S12.500A UNSPECIFIED DISPLACED FRACTURE OF SIXTH CERVICAL VERTEBRA, INITIAL ENCOUNTER FOR CLOSED FRACTURE: ICD-10-CM

## 2025-04-22 PROCEDURE — 99203 OFFICE O/P NEW LOW 30 MIN: CPT

## 2025-04-22 RX ORDER — VALSARTAN 160 MG/1
160 TABLET, COATED ORAL
Refills: 0 | Status: ACTIVE | COMMUNITY

## 2025-04-22 RX ORDER — METOPROLOL TARTRATE 75 MG/1
TABLET, FILM COATED ORAL
Refills: 0 | Status: ACTIVE | COMMUNITY

## 2025-05-13 ENCOUNTER — OUTPATIENT (OUTPATIENT)
Dept: OUTPATIENT SERVICES | Facility: HOSPITAL | Age: 70
LOS: 1 days | End: 2025-05-13
Payer: MEDICARE

## 2025-05-13 DIAGNOSIS — S12.500A UNSPECIFIED DISPLACED FRACTURE OF SIXTH CERVICAL VERTEBRA, INITIAL ENCOUNTER FOR CLOSED FRACTURE: ICD-10-CM

## 2025-05-13 PROCEDURE — 72052 X-RAY EXAM NECK SPINE 6/>VWS: CPT | Mod: 26

## 2025-05-20 ENCOUNTER — APPOINTMENT (OUTPATIENT)
Dept: NEUROSURGERY | Facility: CLINIC | Age: 70
End: 2025-05-20

## 2025-05-20 PROCEDURE — 99212 OFFICE O/P EST SF 10 MIN: CPT | Mod: 93

## 2025-06-02 ENCOUNTER — APPOINTMENT (OUTPATIENT)
Dept: PULMONOLOGY | Facility: CLINIC | Age: 70
End: 2025-06-02

## 2025-06-16 ENCOUNTER — APPOINTMENT (OUTPATIENT)
Dept: CT IMAGING | Facility: CLINIC | Age: 70
End: 2025-06-16

## 2025-06-23 ENCOUNTER — APPOINTMENT (OUTPATIENT)
Dept: PULMONOLOGY | Facility: CLINIC | Age: 70
End: 2025-06-23
Payer: MEDICARE

## 2025-06-23 VITALS
SYSTOLIC BLOOD PRESSURE: 134 MMHG | DIASTOLIC BLOOD PRESSURE: 98 MMHG | HEART RATE: 83 BPM | OXYGEN SATURATION: 98 % | RESPIRATION RATE: 16 BRPM

## 2025-06-23 VITALS — BODY MASS INDEX: 22.76 KG/M2 | HEIGHT: 60.5 IN | WEIGHT: 119 LBS

## 2025-06-23 PROCEDURE — 99406 BEHAV CHNG SMOKING 3-10 MIN: CPT

## 2025-06-23 PROCEDURE — 94010 BREATHING CAPACITY TEST: CPT

## 2025-06-23 PROCEDURE — 85018 HEMOGLOBIN: CPT | Mod: QW

## 2025-06-23 PROCEDURE — 94727 GAS DIL/WSHOT DETER LNG VOL: CPT

## 2025-06-23 PROCEDURE — 94729 DIFFUSING CAPACITY: CPT

## 2025-06-23 PROCEDURE — 99214 OFFICE O/P EST MOD 30 MIN: CPT | Mod: 25

## 2025-06-23 RX ORDER — VALSARTAN 40 MG/1
TABLET, COATED ORAL
Refills: 0 | Status: ACTIVE | COMMUNITY

## 2025-06-23 RX ORDER — METOPROLOL TARTRATE 75 MG/1
TABLET, FILM COATED ORAL
Refills: 0 | Status: ACTIVE | COMMUNITY

## 2025-06-30 ENCOUNTER — NON-APPOINTMENT (OUTPATIENT)
Age: 70
End: 2025-06-30

## 2025-06-30 VITALS — WEIGHT: 119 LBS | HEIGHT: 60.5 IN | BODY MASS INDEX: 22.76 KG/M2

## 2025-07-14 ENCOUNTER — APPOINTMENT (OUTPATIENT)
Dept: CT IMAGING | Facility: CLINIC | Age: 70
End: 2025-07-14
Payer: MEDICARE

## 2025-07-14 ENCOUNTER — OUTPATIENT (OUTPATIENT)
Dept: OUTPATIENT SERVICES | Facility: HOSPITAL | Age: 70
LOS: 1 days | End: 2025-07-14

## 2025-07-14 DIAGNOSIS — F17.210 NICOTINE DEPENDENCE, CIGARETTES, UNCOMPLICATED: ICD-10-CM

## 2025-07-14 PROCEDURE — 71271 CT THORAX LUNG CANCER SCR C-: CPT | Mod: 26

## 2025-07-21 ENCOUNTER — NON-APPOINTMENT (OUTPATIENT)
Age: 70
End: 2025-07-21

## 2025-09-02 ENCOUNTER — APPOINTMENT (OUTPATIENT)
Dept: NEUROLOGY | Facility: CLINIC | Age: 70
End: 2025-09-02
Payer: MEDICARE

## 2025-09-02 VITALS
WEIGHT: 119 LBS | DIASTOLIC BLOOD PRESSURE: 101 MMHG | HEIGHT: 60.5 IN | SYSTOLIC BLOOD PRESSURE: 162 MMHG | HEART RATE: 89 BPM | BODY MASS INDEX: 22.76 KG/M2

## 2025-09-02 DIAGNOSIS — R25.1 TREMOR, UNSPECIFIED: ICD-10-CM

## 2025-09-02 PROCEDURE — 99204 OFFICE O/P NEW MOD 45 MIN: CPT

## 2025-09-02 PROCEDURE — G2211 COMPLEX E/M VISIT ADD ON: CPT

## 2025-09-09 ENCOUNTER — LABORATORY RESULT (OUTPATIENT)
Age: 70
End: 2025-09-09

## 2025-09-10 LAB
T3RU NFR SERPL: 1.1 TBI
T4 SERPL-MCNC: 6.7 UG/DL
TSH SERPL-ACNC: 0.85 UIU/ML